# Patient Record
Sex: FEMALE | URBAN - METROPOLITAN AREA
[De-identification: names, ages, dates, MRNs, and addresses within clinical notes are randomized per-mention and may not be internally consistent; named-entity substitution may affect disease eponyms.]

---

## 2022-02-14 ENCOUNTER — OFFICE VISIT (OUTPATIENT)
Dept: URBAN - METROPOLITAN AREA TELEHEALTH 2 | Facility: TELEHEALTH | Age: 47
End: 2022-02-14
Payer: COMMERCIAL

## 2022-02-14 DIAGNOSIS — K58.9 IBS (IRRITABLE BOWEL SYNDROME)-C: ICD-10-CM

## 2022-02-14 PROCEDURE — 97803 MED NUTRITION INDIV SUBSEQ: CPT | Performed by: DIETITIAN, REGISTERED

## 2022-02-14 NOTE — HPI-TODAY'S VISIT:
Nutrition initial visit:  Patient referred by pcp.  Patient reports IBS-C for years.  She stopped eating wheat about a year ago and her symptoms have improved.  Diet recall:  breakfast smoothie with banana, janay seed, and fruit and oat flour muffins with coconut oil.  Lunch chicken fish, nuts, milk, marty.  She uses stevia, monk fruit, erithryol.  Some added sugar.  She would like information about low fodmap diet.

## 2022-02-16 ENCOUNTER — TELEPHONE ENCOUNTER (OUTPATIENT)
Dept: URBAN - METROPOLITAN AREA CLINIC 128 | Facility: CLINIC | Age: 47
End: 2022-02-16